# Patient Record
Sex: MALE | Race: WHITE | HISPANIC OR LATINO | Employment: FULL TIME | URBAN - METROPOLITAN AREA
[De-identification: names, ages, dates, MRNs, and addresses within clinical notes are randomized per-mention and may not be internally consistent; named-entity substitution may affect disease eponyms.]

---

## 2019-07-06 ENCOUNTER — HOSPITAL ENCOUNTER (EMERGENCY)
Facility: MEDICAL CENTER | Age: 34
End: 2019-07-06
Attending: EMERGENCY MEDICINE

## 2019-07-06 ENCOUNTER — APPOINTMENT (OUTPATIENT)
Dept: RADIOLOGY | Facility: MEDICAL CENTER | Age: 34
End: 2019-07-06
Attending: EMERGENCY MEDICINE

## 2019-07-06 VITALS
DIASTOLIC BLOOD PRESSURE: 63 MMHG | OXYGEN SATURATION: 98 % | BODY MASS INDEX: 26.91 KG/M2 | TEMPERATURE: 98.3 F | SYSTOLIC BLOOD PRESSURE: 110 MMHG | HEART RATE: 70 BPM | WEIGHT: 157.63 LBS | RESPIRATION RATE: 16 BRPM | HEIGHT: 64 IN

## 2019-07-06 DIAGNOSIS — M71.10 SEPTIC BURSITIS: ICD-10-CM

## 2019-07-06 LAB
ALBUMIN SERPL BCP-MCNC: 4.5 G/DL (ref 3.2–4.9)
ALBUMIN/GLOB SERPL: 1.3 G/DL
ALP SERPL-CCNC: 101 U/L (ref 30–99)
ALT SERPL-CCNC: 32 U/L (ref 2–50)
ANION GAP SERPL CALC-SCNC: 8 MMOL/L (ref 0–11.9)
AST SERPL-CCNC: 20 U/L (ref 12–45)
BASOPHILS # BLD AUTO: 0.5 % (ref 0–1.8)
BASOPHILS # BLD: 0.06 K/UL (ref 0–0.12)
BILIRUB SERPL-MCNC: 0.5 MG/DL (ref 0.1–1.5)
BUN SERPL-MCNC: 17 MG/DL (ref 8–22)
CALCIUM SERPL-MCNC: 10.1 MG/DL (ref 8.5–10.5)
CHLORIDE SERPL-SCNC: 105 MMOL/L (ref 96–112)
CO2 SERPL-SCNC: 23 MMOL/L (ref 20–33)
CREAT SERPL-MCNC: 0.98 MG/DL (ref 0.5–1.4)
CRP SERPL HS-MCNC: 7.5 MG/DL (ref 0–0.75)
EOSINOPHIL # BLD AUTO: 0.1 K/UL (ref 0–0.51)
EOSINOPHIL NFR BLD: 0.8 % (ref 0–6.9)
ERYTHROCYTE [DISTWIDTH] IN BLOOD BY AUTOMATED COUNT: 39 FL (ref 35.9–50)
ERYTHROCYTE [SEDIMENTATION RATE] IN BLOOD BY WESTERGREN METHOD: 29 MM/HOUR (ref 0–15)
GLOBULIN SER CALC-MCNC: 3.5 G/DL (ref 1.9–3.5)
GLUCOSE SERPL-MCNC: 108 MG/DL (ref 65–99)
HCT VFR BLD AUTO: 47.9 % (ref 42–52)
HGB BLD-MCNC: 17 G/DL (ref 14–18)
IMM GRANULOCYTES # BLD AUTO: 0.06 K/UL (ref 0–0.11)
IMM GRANULOCYTES NFR BLD AUTO: 0.5 % (ref 0–0.9)
LACTATE BLD-SCNC: 1.3 MMOL/L (ref 0.5–2)
LYMPHOCYTES # BLD AUTO: 2.14 K/UL (ref 1–4.8)
LYMPHOCYTES NFR BLD: 17.1 % (ref 22–41)
MCH RBC QN AUTO: 30.8 PG (ref 27–33)
MCHC RBC AUTO-ENTMCNC: 35.5 G/DL (ref 33.7–35.3)
MCV RBC AUTO: 86.8 FL (ref 81.4–97.8)
MONOCYTES # BLD AUTO: 0.66 K/UL (ref 0–0.85)
MONOCYTES NFR BLD AUTO: 5.3 % (ref 0–13.4)
NEUTROPHILS # BLD AUTO: 9.46 K/UL (ref 1.82–7.42)
NEUTROPHILS NFR BLD: 75.8 % (ref 44–72)
NRBC # BLD AUTO: 0 K/UL
NRBC BLD-RTO: 0 /100 WBC
PLATELET # BLD AUTO: 261 K/UL (ref 164–446)
PMV BLD AUTO: 10.6 FL (ref 9–12.9)
POTASSIUM SERPL-SCNC: 3.9 MMOL/L (ref 3.6–5.5)
PROT SERPL-MCNC: 8 G/DL (ref 6–8.2)
RBC # BLD AUTO: 5.52 M/UL (ref 4.7–6.1)
SODIUM SERPL-SCNC: 136 MMOL/L (ref 135–145)
WBC # BLD AUTO: 12.5 K/UL (ref 4.8–10.8)

## 2019-07-06 PROCEDURE — 700105 HCHG RX REV CODE 258: Performed by: EMERGENCY MEDICINE

## 2019-07-06 PROCEDURE — 85652 RBC SED RATE AUTOMATED: CPT

## 2019-07-06 PROCEDURE — 73562 X-RAY EXAM OF KNEE 3: CPT | Mod: RT

## 2019-07-06 PROCEDURE — 83605 ASSAY OF LACTIC ACID: CPT

## 2019-07-06 PROCEDURE — A9270 NON-COVERED ITEM OR SERVICE: HCPCS | Performed by: EMERGENCY MEDICINE

## 2019-07-06 PROCEDURE — 700101 HCHG RX REV CODE 250: Performed by: EMERGENCY MEDICINE

## 2019-07-06 PROCEDURE — 86140 C-REACTIVE PROTEIN: CPT

## 2019-07-06 PROCEDURE — 99285 EMERGENCY DEPT VISIT HI MDM: CPT

## 2019-07-06 PROCEDURE — 85025 COMPLETE CBC W/AUTO DIFF WBC: CPT

## 2019-07-06 PROCEDURE — 700102 HCHG RX REV CODE 250 W/ 637 OVERRIDE(OP): Performed by: EMERGENCY MEDICINE

## 2019-07-06 PROCEDURE — 96365 THER/PROPH/DIAG IV INF INIT: CPT

## 2019-07-06 PROCEDURE — 80053 COMPREHEN METABOLIC PANEL: CPT

## 2019-07-06 PROCEDURE — 87040 BLOOD CULTURE FOR BACTERIA: CPT

## 2019-07-06 RX ORDER — CLINDAMYCIN PHOSPHATE 600 MG/50ML
600 INJECTION, SOLUTION INTRAVENOUS ONCE
Status: COMPLETED | OUTPATIENT
Start: 2019-07-06 | End: 2019-07-06

## 2019-07-06 RX ORDER — IBUPROFEN 600 MG/1
600 TABLET ORAL ONCE
Status: COMPLETED | OUTPATIENT
Start: 2019-07-06 | End: 2019-07-06

## 2019-07-06 RX ORDER — SODIUM CHLORIDE 9 MG/ML
1000 INJECTION, SOLUTION INTRAVENOUS ONCE
Status: COMPLETED | OUTPATIENT
Start: 2019-07-06 | End: 2019-07-06

## 2019-07-06 RX ORDER — AMOXICILLIN AND CLAVULANATE POTASSIUM 875; 125 MG/1; MG/1
1 TABLET, FILM COATED ORAL 2 TIMES DAILY
Qty: 20 TAB | Refills: 0 | Status: SHIPPED | OUTPATIENT
Start: 2019-07-06 | End: 2019-07-16

## 2019-07-06 RX ADMIN — IBUPROFEN 600 MG: 600 TABLET ORAL at 17:15

## 2019-07-06 RX ADMIN — CLINDAMYCIN IN 5 PERCENT DEXTROSE 600 MG: 12 INJECTION, SOLUTION INTRAVENOUS at 17:35

## 2019-07-06 RX ADMIN — SODIUM CHLORIDE 1000 ML: 9 INJECTION, SOLUTION INTRAVENOUS at 17:35

## 2019-07-06 NOTE — ED TRIAGE NOTES
Ambulates to triage  Chief Complaint   Patient presents with   • Knee Pain     Pt hurt his knee a couple days ago, but does not recall a particular event.  R knee is red and slightly swollen.

## 2019-07-07 ENCOUNTER — HOSPITAL ENCOUNTER (EMERGENCY)
Facility: MEDICAL CENTER | Age: 34
End: 2019-07-07
Attending: EMERGENCY MEDICINE

## 2019-07-07 VITALS
DIASTOLIC BLOOD PRESSURE: 79 MMHG | RESPIRATION RATE: 16 BRPM | BODY MASS INDEX: 27.36 KG/M2 | HEART RATE: 79 BPM | SYSTOLIC BLOOD PRESSURE: 126 MMHG | HEIGHT: 64 IN | WEIGHT: 160.27 LBS | TEMPERATURE: 98.1 F | OXYGEN SATURATION: 98 %

## 2019-07-07 DIAGNOSIS — M70.41 PREPATELLAR BURSITIS OF RIGHT KNEE: ICD-10-CM

## 2019-07-07 PROCEDURE — 99284 EMERGENCY DEPT VISIT MOD MDM: CPT

## 2019-07-07 PROCEDURE — A9270 NON-COVERED ITEM OR SERVICE: HCPCS | Performed by: EMERGENCY MEDICINE

## 2019-07-07 PROCEDURE — 700102 HCHG RX REV CODE 250 W/ 637 OVERRIDE(OP): Performed by: EMERGENCY MEDICINE

## 2019-07-07 RX ORDER — IBUPROFEN 600 MG/1
600 TABLET ORAL ONCE
Status: COMPLETED | OUTPATIENT
Start: 2019-07-07 | End: 2019-07-07

## 2019-07-07 RX ADMIN — IBUPROFEN 600 MG: 600 TABLET ORAL at 21:01

## 2019-07-07 ASSESSMENT — LIFESTYLE VARIABLES: DO YOU DRINK ALCOHOL: NO

## 2019-07-07 NOTE — ED PROVIDER NOTES
ED Provider Note    Scribed for Rylan Willard M.D. by Dana Cottrell. 7/6/2019, 5:08 PM.    Primary care provider: None noted  Means of arrival: Walk-in  History obtained from: Patient  History limited by: None    CHIEF COMPLAINT  Chief Complaint   Patient presents with   • Knee Pain       HPI  Dave Torres is a 33 y.o. previously healthy male who presents to the Emergency Department complaining of right knee pain onset 3 days ago. Per patient, he was working in his attic Wednesday afternoon when he suddenly felt discomfort after stepping out of the attic but states he does not remember any trauma to the area beforehand.  He did note 2 small superficial cuts over his knee at the time.  The patient states the pain is constant. He reports his pain is worsened with movement but alleviated slightly after he applied baking soda and ice today. The patient notes he has been taking Tylenol with no relief of his symptoms. He states the pain radiates up to his right groin. The patient endorses associated subjective fever and diaphoresis on day of onset which have both since resolved and associated right knee edema today. He denies any fevers today. He denies any history of diabetes or immunosuppression.     REVIEW OF SYSTEMS  Pertinent positives include right knee pain, right knee edema, subjective fever (resolved), and diaphoresis (resolved). Pertinent negatives include no fever. As above, all other systems reviewed and are negative.   See HPI for further details.     PAST MEDICAL HISTORY   No past medical history of diabetes    SURGICAL HISTORY  patient denies any surgical history    SOCIAL HISTORY  Social History   Substance Use Topics   • Smoking status: Current Some Day Smoker   • Smokeless tobacco: Never Used   • Alcohol use Yes      Comment: rare      History   Drug Use   • Types: Inhaled     Comment: pot       FAMILY HISTORY  History reviewed. No pertinent family history.    CURRENT MEDICATIONS  Home Medications      "Reviewed by Airam Augustine R.N. (Registered Nurse) on 07/06/19 at 1618  Med List Status: Complete   Medication Last Dose Status        Patient Rony Taking any Medications                       ALLERGIES  No Known Allergies    PHYSICAL EXAM  VITAL SIGNS: /81   Pulse (!) 103   Temp 36.9 °C (98.5 °F) (Temporal)   Resp 16   Ht 1.626 m (5' 4\")   Wt 71.5 kg (157 lb 10.1 oz)   SpO2 96%   BMI 27.06 kg/m²   Vitals reviewed.  Constitutional: Alert in no apparent distress.  HENT: No signs of trauma, Bilateral external ears normal, Nose normal.  Eyes: Pupils are equal and reactive, Conjunctiva normal, Non-icteric.   Neck: Normal range of motion, No tenderness, Supple, No stridor.   Lymphatic: No lymphadenopathy noted.   Cardiovascular: Regular rate and rhythm, no murmurs.   Thorax & Lungs: Normal breath sounds, No respiratory distress, No wheezing, No chest tenderness.   Abdomen: Bowel sounds normal, Soft, No tenderness, No peritoneal signs, No masses, No pulsatile masses.   Skin: Warm, Dry, No erythema, No rash.   Back: Normal alignment. No CVAT.   Extremities: Right knee edema, tenderness over right prepatellar bursa region without obvious fluctuance, erythematous streaking along the medial aspect of right thigh to right inguinal region, tender right inguinal lymphadenopathy, full range of motion of right knee. Intact distal pulses, No cyanosis.  Musculoskeletal: Good range of motion in all major joints. No major deformities noted.   Neurologic: Alert, Normal motor function, Normal sensory function, No focal deficits noted.   Psychiatric: Affect normal, Judgment normal, Mood normal.     DIAGNOSTIC STUDIES / PROCEDURES    LABS  Labs Reviewed   CBC WITH DIFFERENTIAL - Abnormal; Notable for the following:        Result Value    WBC 12.5 (*)     MCHC 35.5 (*)     Neutrophils-Polys 75.80 (*)     Lymphocytes 17.10 (*)     Neutrophils (Absolute) 9.46 (*)     All other components within normal limits   COMP " "METABOLIC PANEL - Abnormal; Notable for the following:     Glucose 108 (*)     Alkaline Phosphatase 101 (*)     All other components within normal limits   CRP QUANTITIVE (NON-CARDIAC) - Abnormal; Notable for the following:     Stat C-Reactive Protein 7.50 (*)     All other components within normal limits   WESTERGREN SED RATE - Abnormal; Notable for the following:     Sed Rate Westergren 29 (*)     All other components within normal limits   LACTIC ACID   BLOOD CULTURE    Narrative:     Per Hospital Policy: Only change Specimen Src: to \"Line\" if  specified by physician order.   BLOOD CULTURE    Narrative:     Per Hospital Policy: Only change Specimen Src: to \"Line\" if  specified by physician order.   ESTIMATED GFR      All labs reviewed by me.    RADIOLOGY  DX-KNEE 3 VIEWS RIGHT   Final Result      Negative RIGHT knee series.        The radiologist's interpretation of all radiological studies have been reviewed by me.    COURSE & MEDICAL DECISION MAKING  Nursing notes, VS, PMSFHx reviewed in chart.  Differential diagnoses include but not limited to: Cellulitis, septic bursitis, Septic Arthritis, Necrotizing Fascitis.     5:08 PM Patient seen and examined at bedside. Patient arrives tachycardic but afebrile with otherwise normal vital signs.  Heart rate had improved to normal upon my exam. Patient appears well hydrated and non-toxic. The physical exam is remarkable for erythema and tenderness over the right knee, tenderness is most specific over the right bursa but there is no area of fluctuance.  He does have an area of erythematous streaking up the medial aspect of the right thigh that terminates in the right inguinal region.  There is some tender right inguinal lymphadenopathy consistent with a potential lymphangitic component.  Patient is able to range his knee with full flexion extension and I have a low suspicion for septic arthritis.  Ordered for DX-knee right, CBC with differential, CMP, Lactic acid, Blood " culture x2, CRP Quantitive, Westergren Sed Rate to evaluate. Patient will be treated with Cleocin  mg, NS infusion 1,000 mL, and Motrin 600 mg for his symptoms.      HYDRATION: Based on the patient's presentation of Sepsis and Tachycardia the patient was given IV fluids. IV Hydration was used because oral hydration was not adequate alone. Upon recheck following hydration, the patient was Improved.     5:32 PM - Patient was reevaluated at bedside.  Patient has a mild leukocytosis with neutrophilic predominance but no bandemia.  His ESR is 29 and his CRP is 7.5.  Metabolic panel is unremarkable beyond a very slightly elevated blood glucose to 108.  X-ray results were normal with the patient.     5:35 PM - Paged Ortho.     5:44 PM I discussed the patient's case and the above findings with Dr. Smith (Ortho) who concurs with likely septic bursitis.  He feels the patient could either be admitted to the hospitalist or discharged with oral antibiotics and close follow-up.  He will not follow the patient if admitted unless asked by hospitalist.  He concurs that no I&D should be performed if there is no area of fluctuance over the prepatellar bursa.    5:46 PM - I updated patient on lab results and plan of care including options for admission today or discharge home with antibiotics and follow up in 24 hours for a recheck.  Patient declines admission and would like to trial outpatient antibiotics.  He agrees to a mandatory 24-hour return to the ER for re-check.  Gave patient prescription for Augmentin.  Cautioned patient to return immediately to ED if any worsening symptoms. Patient understands and agrees.     The patient will return for new or worsening symptoms and is stable at the time of discharge.    DISPOSITION:  Patient will be discharged home in stable condition.    FOLLOW UP:  [facility], Emergency Dept  [address] [facility] [address] [phone]  Go in 1 day  For mandatory  recheck      OUTPATIENT MEDICATIONS:  New Prescriptions    AMOXICILLIN-CLAVULANATE (AUGMENTIN) 875-125 MG TAB    Take 1 Tab by mouth 2 times a day for 10 days.       FINAL IMPRESSION  1. Septic bursitis          IDana (Scribe), am scribing for, and in the presence of, Rylan Willard M.D..    Electronically signed by: Dana Cottrell (Scribe), 7/6/2019    IRylan M.D. personally performed the services described in this documentation, as scribed by Dana Cottrell in my presence, and it is both accurate and complete.    C    The note accurately reflects work and decisions made by me.  Rylan Willard  7/6/2019  6:27 PM

## 2019-07-07 NOTE — DISCHARGE INSTRUCTIONS
You were seen in the ER for right knee pain that is likely due to something called septic bursitis as we have discussed in the ER.  We discussed admission and you would prefer to have a trial of outpatient antibiotics I am giving you a prescription for Augmentin, please take it as directed.  Please take your first dose tonight.  You have agreed to return to the ER in 24 hours for mandatory recheck.  You can take Tylenol and/or ibuprofen for the pain.  If your symptoms worsen over the course of the next 24 hours or you develop new symptoms please return immediately to the ER.

## 2019-07-07 NOTE — ED NOTES
Pt ambulatory  Vital signs stable  Pt handed d/c paperwork with understanding stated  Pt states will follow up with ed in 24 hrs  Pt handed prescriptions and states safe way home

## 2019-07-08 NOTE — ED PROVIDER NOTES
ED Provider Note    Scribed for Pancho Jiang M.D. by Bryant Arriaga. 7/7/2019, 8:37 PM.    Primary care provider: None noted  Means of arrival: Walk in  History obtained from: Patient  History limited by: None    CHIEF COMPLAINT  Chief Complaint   Patient presents with   • Knee Swelling     Pt was seen yesterday for swelling of his right knee. Pt was diagnosed with septic bursitis and prescribed augmentin. Pt states that his condition has not gotten worse, but he also states that it has not gotten better. Pt has questions if this is related to workers comp. Pt is also concerned about work tomorrow.        HPI  Dave Torres is a 33 y.o. male who presents to the Emergency Department for evaluation of right knee pain that radiates up to his right groin onset 4 days ago. He states the pain initially began after stepping out of an attic and noticed 2 small superficial abrasions to his knee at that time. No alleviating or exacerbating factors are identified. The patient reports he was seen in the ED yesterday for the same complaint and was diagnosed with a septic bursitis at that time. He was given the option of discharge vs admission, and chose to be discharged home on Augmentin. The patient states he was prompted to return to the ED tonight as his right knee pain has not improved and is concerned about how he will work tomorrow. He has no associated fever at this time. The patient reports no history of long term health conditions.     REVIEW OF SYSTEMS  See HPI for further details.     PAST MEDICAL HISTORY   No diabetes    SURGICAL HISTORY  patient denies any surgical history    SOCIAL HISTORY  Social History   Substance Use Topics   • Smoking status: Current Some Day Smoker   • Smokeless tobacco: Never Used   • Alcohol use Yes      Comment: rare      History   Drug Use   • Types: Inhaled     Comment: THC       FAMILY HISTORY  History reviewed. No pertinent family history.    CURRENT MEDICATIONS  Reviewed.  See  "Encounter Summary.     ALLERGIES  No Known Allergies    PHYSICAL EXAM  VITAL SIGNS: /75   Pulse 82   Temp 36.2 °C (97.2 °F)   Resp 16   Ht 1.626 m (5' 4\")   Wt 72.7 kg (160 lb 4.4 oz)   SpO2 96%   BMI 27.51 kg/m²   Constitutional: Alert in no apparent distress.  HENT: Normocephalic, Atraumatic, Bilateral external ears normal. Nose normal.   Eyes: Pupils are equal and reactive. Conjunctiva normal, non-icteric.   Heart: Regular rate and rythm, no murmurs.    Lungs: Clear to auscultation bilaterally.  Extremities: Right knee edema, tenderness over right prepatellar bursa region without obvious fluctuance, erythematous streaking along the medial aspect of right thigh to right inguinal region, tender right inguinal lymphadenopathy, full range of motion of right knee.  Skin: Warm, Dry, No erythema, No rash.   Neurologic: Alert, Grossly non-focal.   Psychiatric: Affect normal, Judgment normal, Mood normal, Appears appropriate and not intoxicated.     COURSE & MEDICAL DECISION MAKING  Nursing notes, VS, PMSFHx reviewed in chart.    8:33 PM - Obtained and reviewed past medical records which indicate patient was seen in the ED yesterday for the same complaint and diagnosed with septic bursitis. Discharge vs admission was discussed with patient and he ultimately chose to be discharged home. He was given a prescription for Augmentin at that time.     8:37 PM - Patient seen and examined at bedside. He is well appearing with stable vital signs. Patient was again offered admission to the hospital to receive IV antibiotics, but wishes to be discharged. The patient additionally refused repeat lab work. He was made aware I will discharge him home, but he will need to continue previously prescribed course of Augmentin. The patient is informed he will need to follow up with Dr. Smith (Orthopedics) as recommended yesterday. He is instructed to take Tylenol/Ibuprofne for pain management and to ice the affected area. The " "patient is made aware he should return to the ED for any new or worsening symptoms.     Decision Making:  This is a 33 y.o. year old male who presents with recurrent evaluation of persistent right knee inflammation.  Patient was seen by my colleague a few days ago with extensive evaluation at that time.  At the time the patient had been given option for inpatient versus outpatient options.  At that time and as reviewed by myself he did have elevation of his inflammatory markers.  Orthopedics again stated the patient could be admitted to the hospital service and they would be willing to be consulted if needed.  At this point the patient states that the symptoms of her oral been stagnant.  No significant worsening or improvement.  Given that tomorrow is Monday he was concerned about his ability to work and that was the reason for coming back to the hospital today.  At this point I do not believe that he has had a \"clinical failure \"of outpatient oral antibiotics given the brevity of current duration.  I have offered repeat evaluation from a laboratory perspective and again have offered inpatient stay.  The patient continues to be adamant about not pursuing inpatient course and wishes to continue with outpatient oral medications.  For that reason the patient also is now deferring laboratory evaluation and wishes to continue with the previous plan as was decided a few days ago.  He is again understanding of strict return precautions and outpatient follow-up with orthopedics and primary care as referred.    DISPOSITION:  Patient will be discharged home in good condition.    The patient was discharged home (see d/c instructions) and told to return immediately for any signs or symptoms listed, or any worsening at all.  The patient verbally agreed to the discharge precautions and follow-up plan which is documented in EPIC.    DISPOSITION:  Patient will be discharged home in stable condition.    FOLLOW UP:  Anibal Smith, " M.D.  [address]  [facility] [address]  [phone]    Schedule an appointment as soon as possible for a visit in 1 week      LOCUST  [address]  [facility] [address]  Schedule an appointment as soon as possible for a visit in 1 week      [facility], Emergency Dept  [address]  [facility] [address] [phone]    If symptoms worsen    FINAL IMPRESSION  1. Prepatellar bursitis of right knee          Bryant COOL (Mariana), am scribing for, and in the presence of, Pancho Jiang M.D..    Electronically signed by: Bryant Arriaga (Mariana), 7/7/2019    IPancho M.D. personally performed the services described in this documentation, as scribed by Bryant Arriaga in my presence, and it is both accurate and complete.    E.    The note accurately reflects work and decisions made by me.  Pancho Jiang  7/8/2019  10:53 PM

## 2019-07-08 NOTE — ED NOTES
Pt ambulated with mild limp but steady gait to ED yellow room 58. Pt states was here yesterday but feels R knee swelling has not improved- remains about same as when he was seen yesterday. Pt says is worried about work tomorrow. Awaiting MD evaluation. Pt denies any other s/s, no acute distress noted.

## 2019-07-08 NOTE — ED TRIAGE NOTES
"Dave Torres  Chief Complaint   Patient presents with   • Knee Injury     Pt was seen yesterday for swelling of his right knee. Pt was diagnosed with septic bursitis and prescribed augmentin. Pt states that his condition has not gotten worse, but he also states that it has not gotten better. Pt has questions if this is related to workers comp. Pt is also concerned about work tomorrow.      Pt ambulatory with limp to triage with above complaint.     /75   Pulse 82   Temp 36.2 °C (97.2 °F)   Resp 16   Ht 1.626 m (5' 4\")   Wt 72.7 kg (160 lb 4.4 oz)   SpO2 96%   BMI 27.51 kg/m²     Pt informed of triage process and encouraged to notify staff of any changes or concerns. Pt verbalized understanding of instructions. Apologized for long wait time. Pt placed back in lobby.     "

## 2019-07-11 LAB
BACTERIA BLD CULT: NORMAL
BACTERIA BLD CULT: NORMAL
SIGNIFICANT IND 70042: NORMAL
SIGNIFICANT IND 70042: NORMAL
SITE SITE: NORMAL
SITE SITE: NORMAL
SOURCE SOURCE: NORMAL
SOURCE SOURCE: NORMAL